# Patient Record
(demographics unavailable — no encounter records)

---

## 2024-12-13 NOTE — HISTORY OF PRESENT ILLNESS
[FreeTextEntry5] : 11th grade ([facility]) IEP: LD Resource Room - every day. Preferential seating; refocus and redirect as needed; testing accommodations (including reading test questions aloud)  Repeated KG when moved from [place]. [FreeTextEntry1] : De is now in 11th grade, and he is doing well academically.  Q1 RC is excellent -- with lowest grade was 88.   Last year (10th grade), De overall did well in school in his last quarter.  She says that the addition of resource room has been very HUGE of working with De -- working on weaknesses and making sure he completes all of his HW and assignments.  His grades vary across subjects and semesters.  His grades range from 70s - 80s and occasionally higher.  He passed all of his classes and there was no need for summer school.  IEP for 11th grade -- same as last year.  HIs current academic performance has tremendously benefited from the addition of resource room in 10th grade.  By contrast, in 9th grade, he failed math by 1 point.  (Mom says that failing with COVID guidance was <55.)  He had to go to summer school for math, which he passed.  (In 8th grade, De failed several different subjects; he needed to do summer school.  The same thing happened to him in 7th grade as well.)  De continues to have an IEP based on his Fall 2022 triennial eval; psycho-ed testing again revealed a gap between his Verbal IQ and reading scores.  Mom says that De still enjoys gong to resource room every day, and his self-esteem has also improved with the improvement and support that has come with his IEP.  In terms of medication, Mom says that the single IR dose in AM continues to be working out well.  His core subjects are in the morning, and electives are in the afternoon.  This evolved when De only took MAS 15 mg IR one time in the AM during the summer (2023) for summer school. Because he had fewer side effects on the one-daily dose, he continued on just the AM IR dose for school this year (10th grade), and this seems to be OK.  He has a much-improved appetite at lunch, he is not scratching his scalp, and he is having fewer episodes of Raynaud's.  He is also "a lot more social".    Since June 2023, he is no longer on guanfacine, cyproheptadine, or N-A-C.  He continues to take his fish oil capsules.  Mom thinks that Chens Raynaud's seems to be better; if he is in a cold room, his feet will turn blue.  Mom thinks it is much less frequent now that he is on a lower dose.  Mom has always said that it is not at all problematic for De. When he wears socks, this is less of a visible problem.  De no longer gets Periactin for his appetite; it was stopped over the summer (2023) when they stopped using the AXR and just the IR AM dose.   De has no sleep issues.  He goes to sleep quickly on time -- in bed at 9 and asleep by 10 - 10:30 -- all without melatonin.   Socially -- Mom says that De is doing well; he is feeling more comfortable with himself.  He has a nice [place] of friends.  Activities: Literary magazine; art club; [employer] club.  enjoys drawing; De enjoys computers -- vipul, programming, making short films from his drawings.    Driving: learner's permit; no 's Ed.   Summer 2024: trip to [place].  Working with mom on her work.  Art school as well for 3-4 weeks. Summer 2023: summer school for math.  Trips with mom and [employer]GM.  Summer 2022: Summer school for 1 Earth Science (though he failed several classes). Summer 2021: Summer school; [place] to see his paternal aunt for a month; saw his dad 2x while there.  Summer 2020: home; no plans. Summer 2019: day camp for 4 weeks; then vacation with MG.   Bilingual exposure: Mom speaks to De in Uzbek, but De is not fluent. He is OK receptively but limited expressively.  When seen in January 2020: Mom asked about getting a psychiatric eval for her own mom in terms of some paranoia and/or delusions.  MGLESLEY is an independently functioning  who would not be accepting of a psychiatry referral.  I suggested that mom go see a psychiatrist herself to get a consult about her own mother.  Mom says that her mother seemed to be worse in May 2022 but seemingly somewhat better when asked in October 2022 and continues to do well as of September 2023. [Major Illness] : no major illness [Major Injury] : no major injury [Surgery] : no surgery [Hospitalizations] : no hospitalizations [New Medications] : no new medication [New Allergies] : no new allergies [FreeTextEntry6] : Loss of appetite -- variable. [Home] : at home, [unfilled] , at the time of the visit. [Other Location: e.g. Home (Enter Location, City,State)___] : at [unfilled] [Mother] : mother [FreeTextEntry3] : Jaycee Hudson (mother)

## 2024-12-13 NOTE — REVIEW OF SYSTEMS
[Ear Infections] : no ear infections [Normal] : Psychiatric [FreeTextEntry4] : No longer has issues with dry throat in AM when he awakens

## 2024-12-13 NOTE — SOCIAL HISTORY
[FreeTextEntry6] : HH: now just Mom and De, 1 cat (Jonathan) and 1 Rottweiler (Max; b 2016) Family will be moving to [place] in Summer 2026 for mom to finish her acupuncture program.   Mother: now FT works for a neurological acupuncture clinic in the [facility] -- e.g., strokes; in the office on weekends and works from home on weekdays.  Mom is still in school to become an acupuncturist; she will finish in 2027.  (Mom worked as a  on an animal farm through [employer]. Mom got head-butted by a sheep and had a concussion, no LOC but mentally foggy; workmen's comp case covered medical expenses; case now closed.)     Mom gets help with childcare from her mother and her godmother.   Father: No longer involved with De.  Lives in [place]; HVAC work.  Almost no contact since 2014, though he did see his dad 2x in summer of 2021.

## 2024-12-13 NOTE — HISTORY OF PRESENT ILLNESS
[FreeTextEntry5] : 11th grade ([facility]) IEP: LD Resource Room - every day. Preferential seating; refocus and redirect as needed; testing accommodations (including reading test questions aloud)  Repeated KG when moved from [place]. [FreeTextEntry1] : De is now in 11th grade, and he is doing well academically.  Q1 RC is excellent -- with lowest grade was 88.   Last year (10th grade), De overall did well in school in his last quarter.  She says that the addition of resource room has been very HUGE of working with De -- working on weaknesses and making sure he completes all of his HW and assignments.  His grades vary across subjects and semesters.  His grades range from 70s - 80s and occasionally higher.  He passed all of his classes and there was no need for summer school.  IEP for 11th grade -- same as last year.  HIs current academic performance has tremendously benefited from the addition of resource room in 10th grade.  By contrast, in 9th grade, he failed math by 1 point.  (Mom says that failing with COVID guidance was <55.)  He had to go to summer school for math, which he passed.  (In 8th grade, De failed several different subjects; he needed to do summer school.  The same thing happened to him in 7th grade as well.)  De continues to have an IEP based on his Fall 2022 triennial eval; psycho-ed testing again revealed a gap between his Verbal IQ and reading scores.  Mom says that De still enjoys gong to resource room every day, and his self-esteem has also improved with the improvement and support that has come with his IEP.  In terms of medication, Mom says that the single IR dose in AM continues to be working out well.  His core subjects are in the morning, and electives are in the afternoon.  This evolved when De only took MAS 15 mg IR one time in the AM during the summer (2023) for summer school. Because he had fewer side effects on the one-daily dose, he continued on just the AM IR dose for school this year (10th grade), and this seems to be OK.  He has a much-improved appetite at lunch, he is not scratching his scalp, and he is having fewer episodes of Raynaud's.  He is also "a lot more social".    Since June 2023, he is no longer on guanfacine, cyproheptadine, or N-A-C.  He continues to take his fish oil capsules.  Mom thinks that Chens Raynaud's seems to be better; if he is in a cold room, his feet will turn blue.  Mom thinks it is much less frequent now that he is on a lower dose.  Mom has always said that it is not at all problematic for De. When he wears socks, this is less of a visible problem.  De no longer gets Periactin for his appetite; it was stopped over the summer (2023) when they stopped using the AXR and just the IR AM dose.   De has no sleep issues.  He goes to sleep quickly on time -- in bed at 9 and asleep by 10 - 10:30 -- all without melatonin.   Socially -- Mom says that De is doing well; he is feeling more comfortable with himself.  He has a nice [place] of friends.  Activities: Literary magazine; art club; [email or web address] club.  enjoys drawing; De enjoys computers -- vipul, programming, making short films from his drawings.    Driving: learner's permit; no 's Ed.   Summer 2024: trip to [place].  Working with mom on her work.  Art school as well for 3-4 weeks. Summer 2023: summer school for math.  Trips with mom and MGM.  Summer 2022: Summer school for 1 Earth Science (though he failed several classes). Summer 2021: Summer school; visited [place] to see his paternal aunt for a month; saw his dad 2x while there.  Summer 2020: home; no plans. Summer 2019: day camp for 4 weeks; then vacation with MG.   Bilingual exposure: Mom speaks to De in Maltese, but De is not fluent. He is OK receptively but limited expressively.  When seen in January 2020: Mom asked about getting a psychiatric eval for her own mom in terms of some paranoia and/or delusions.  MGLESLEY is an independently functioning  who would not be accepting of a psychiatry referral.  I suggested that mom go see a psychiatrist herself to get a consult about her own mother.  Mom says that her mother seemed to be worse in May 2022 but seemingly somewhat better when asked in October 2022 and continues to do well as of September 2023. [Major Illness] : no major illness [Major Injury] : no major injury [Surgery] : no surgery [Hospitalizations] : no hospitalizations [New Medications] : no new medication [New Allergies] : no new allergies [FreeTextEntry6] : Loss of appetite -- variable. [Home] : at home, [unfilled] , at the time of the visit. [Other Location: e.g. Home (Enter Location, City,State)___] : at [unfilled] [Mother] : mother [FreeTextEntry3] : Jaycee Hudson (mother)

## 2024-12-13 NOTE — REASON FOR VISIT
[Follow-Up Visit] : a follow-up visit [FreeTextEntry2] : ADHD -- Medication monitoring and management  LD [FreeTextEntry4] : Adderall 15 mg IR every day in AM only    Still takes a custom-made herbal formulation for his liver (Gilbert's disease) Acupuncture - for liver and general health. (Mom works in an acupuncture clinic and is in school to be an acupuncturist.) [FreeTextEntry1] : Mother [FreeTextEntry5] : n/a [FreeTextEntry3] : August 2024

## 2024-12-13 NOTE — SOCIAL HISTORY
[FreeTextEntry6] : HH: now just Mom and De, 1 cat (Jonathan) and 1 Rottweiler (Max; b 2016) Family will be moving to [place] in Summer 2026 for mom to finish her acupuncture program.   Mother: now FT works for a neurological acupuncture clinic in the [facility] [facility] -- e.g., strokes; in the office on weekends and works from home on weekdays.  Mom is still in school to become an acupuncturist; she will finish in 2027.  (Mom worked as a  on an animal farm through [employer]. Mom got head-butted by a sheep and had a concussion, no LOC but mentally foggy; workmen's comp case covered medical expenses; case now closed.)     Mom gets help with childcare from her mother and her godmother.   Father: No longer involved with De.  Lives in [place]; HVAC work.  Almost no contact since 2014, though he did see his dad 2x in summer of 2021.

## 2024-12-13 NOTE — PLAN
[FreeTextEntry3] : Continue IEP Continue MAS 15 mg IR in AM. Triennial eval -- not due until fall 2025. Answered mother's questions. Office follow-up: 3 - 4 months for an in-office visit; sooner as needed. Telephone follow-up: as needed.

## 2025-03-17 NOTE — PLAN
[FreeTextEntry3] : Continue IEP Continue MAS 15 mg IR in AM. Triennial eval -- not due until fall 2025. Answered mother's questions. Office follow-up: 3 - 4 months; sooner as needed. Telephone follow-up: as needed.

## 2025-03-17 NOTE — REASON FOR VISIT
[Follow-Up Visit] : a follow-up visit [FreeTextEntry2] : ADHD -- Medication monitoring and management  LD [FreeTextEntry4] : Adderall 15 mg IR every day in AM only    Still takes a custom-made herbal formulation for his liver (Gilbert's disease) Acupuncture - for liver and general health. (Mom works in an acupuncture clinic and is in school to be an acupuncturist.) [FreeTextEntry1] : Mother [FreeTextEntry5] : n/a [FreeTextEntry3] : December 2024

## 2025-03-17 NOTE — HISTORY OF PRESENT ILLNESS
[FreeTextEntry5] : 11th grade ([employer] [employer]) IEP: LD Resource Room - every day. Preferential seating; refocus and redirect as needed; testing accommodations (including reading test questions aloud)  Repeated KG when moved from [place]. [FreeTextEntry1] : De continues to do well academically.   Q1 RC is excellent -- with lowest grade was 88.  Q2 RC is excellent -- overall slightly better.   De's current academic performance has tremendously benefited from the addition of resource room in 10th grade.  By contrast, in 9th grade, he failed math by 1 point.  (Mom says that failing with COVID guidance was <55.)  He had to go to summer school for math, which he passed.  (In 8th grade, De failed several different subjects; he needed to do summer school.  The same thing happened to him in 7th grade as well.)  De continues to have an IEP based on his Fall 2022 triennial eval; psycho-ed testing again revealed a gap between his Verbal IQ and reading scores.  De still enjoys gong to resource room every day, and his self-esteem has also improved with the improvement and support that has come with his IEP.  He is due for his triennial re-eval in the fall 2025.  Last year (10th grade), De overall did well in school in his last quarter.  She says that the addition of resource room has been very HUGE of working with De -- working on weaknesses and making sure he completes all of his HW and assignments.  His grades vary across subjects and semesters.  His grades range from 70s - 80s and occasionally higher.  He passed all of his classes and there was no need for summer school.  IEP for 11th grade -- same as last year.  In terms of medication, Mom says that the single IR dose in AM continues to be working out well.  His core subjects are in the morning, and electives are in the afternoon.  This evolved when De only took MAS 15 mg IR one time in the AM during the summer (2023) for summer school. Because he had fewer side effects on the one-daily dose, he continued on just the AM IR dose for school this year (10th grade), and this seems to be OK.  He has a much-improved appetite at lunch, he is not scratching his scalp, and he is having fewer episodes of Raynaud's.  He is also "a lot more social".   Since June 2023, he is no longer on guanfacine, cyproheptadine, or N-A-C.  He now only inconsistently takes fish oil capsules.  Mom thinks that De's Raynaud's seems to be better; if he is in a cold room, his feet will turn blue.  Mom thinks it is much less frequent now that he is on a lower dose.  Mom has always said that it is not at all problematic for De. When he wears socks, this is less of a visible problem.  De no longer gets Periactin for his appetite; it was stopped over the summer (2023) when they stopped using the AXR and just the IR AM dose.   De has no sleep issues.  He goes to sleep quickly on time -- in bed at 9 and asleep by 10 - 10:30 -- all without melatonin.   Socially -- Mom says that De is doing well; he is feeling more comfortable with himself.  He has a nice [place] of friends.  Mom says that he has a "virtual GF"  Activities: Literary magazine; art club; [employer] club.  enjoys drawing; De enjoys computers -- vipul, programming, making short films from his drawings.    Driving: learner's permit; no 's Ed. but drives with his mother.   Summer 2025: no plans as of yet. Summer 2024: trip to [place].  Working with mom on her work.  Art school as well for 3-4 weeks. Summer 2023: summer school for math.  Trips with mom and MG.  Summer 2022: Summer school for 1 Earth Science (though he failed several classes). Summer 2021: Summer school; visited [place] to see his paternal aunt for a month; saw his dad 2x while there.  Summer 2020: home; no plans. Summer 2019: day camp for 4 weeks; then vacation with [facility].   Bilingual exposure: Mom speaks to De in Lithuanian, but De is not fluent. He is OK receptively but limited expressively.  When seen in January 2020: Mom asked about getting a psychiatric eval for her own mom in terms of some paranoia and/or delusions.  TICO is an independently functioning  who would not be accepting of a psychiatry referral.  I suggested that mom go see a psychiatrist herself to get a consult about her own mother.  Mom says that her mother seemed to be worse in May 2022 but seemingly somewhat better when asked in October 2022 and continues to do well as of September 2023. [Major Illness] : no major illness [Major Injury] : no major injury [Surgery] : no surgery [Hospitalizations] : no hospitalizations [New Medications] : no new medication [New Allergies] : no new allergies [FreeTextEntry6] : Loss of appetite -- variable. [Home] : at home, [unfilled] , at the time of the visit. [Other Location: e.g. Home (Enter Location, City,State)___] : at [unfilled] [Mother] : mother [FreeTextEntry3] : Jaycee Hudson (mother)

## 2025-03-17 NOTE — SOCIAL HISTORY
[FreeTextEntry6] : HH: now just Mom and De, 1 cat (Jonathan) and 1 Rottweiler (Max; b 2016) Family will be moving to [place] in Summer 2026 for mom to finish her acupuncture program.   Mother: now FT works for a neurological acupuncture clinic in the [facility] -- e.g., strokes; in the office on weekends and works from home on weekdays.  Mom is still in school to become an acupuncturist; she will finish in 2027.  (Mom worked as a  on an animal farm through [employer]. Mom got head-butted by a sheep and had a concussion, no LOC but mentally foggy; workmen's comp case covered medical expenses; case now closed.)     Mom gets help with childcare from her mother and her godmother.   Father: No longer involved with De.  Lives in [place]; HVAC work.  Almost no contact since 2014, though he did see his dad 2x in summer of 2021.

## 2025-07-21 NOTE — REASON FOR VISIT
[Follow-Up Visit] : a follow-up visit [FreeTextEntry2] : ADHD -- Medication monitoring and management  LD [FreeTextEntry4] : Adderall 15 mg IR every day in AM only (including mendoza)  Still takes a custom-made herbal formulation for his liver (Gilbert's disease) Acupuncture - for liver and general health. (Mom works in an acupuncture clinic and is in school to be an acupuncturist.) [FreeTextEntry1] : Mother [FreeTextEntry5] : n/a [FreeTextEntry3] : December 2024

## 2025-07-21 NOTE — HISTORY OF PRESENT ILLNESS
[Home] : at home, [unfilled] , at the time of the visit. [Other Location: e.g. Home (Enter Location, City,State)___] : at [unfilled] [Mother] : mother [FreeTextEntry5] : Finished 11th grade ([employer]) IEP: LD Resource Room - every day. Preferential seating; refocus and redirect as needed; testing accommodations (including reading test questions aloud)  Repeated KG when moved from [place]. [FreeTextEntry1] : De did well academically.  His overall average for the year was 87.  His weakest subject was history (high 70's or 80, though Regents was 63).  Mom still feels that De's academic performance tremendously benefited from the addition of resource room in 10th grade.  By contrast, in 9th grade, he failed math by 1 point.  (Mom says that failing with COVID guidance was <55.)  He had to go to summer school for math, which he passed.  (In 8th grade, De failed several different subjects; he needed to do summer school.  The same thing happened to him in 7th grade as well.)  De continues to have an IEP based on his Fall 2022 triennial eval; psycho-ed testing again revealed a gap between his Verbal IQ and reading scores.  De still enjoys gong to resource room every day, and his self-esteem has also improved with the improvement and support that has come with his IEP.   He is due for his triennial re-eval in the fall 2025.  In 10th grade, De overall did well in school in his last quarter.  The addition of resource room was very HUGE in terms of helping De -- working on weaknesses and making sure he completes all of his HW and assignments.    IEP for 12th grade -- will be almost the same as this year.  In terms of medication, Mom says that the single IR dose in AM continues to be working out well.  In 11th grade, his core subjects were in the morning, and electives jessika the afternoon.  This dosing regimen evolved when De only took MAS 15 mg IR one time in the AM during the summer (2023) for summer school. Because he had fewer side effects on the one-daily dose, he continued on just the AM IR dose for school this year (10th grade), and this seems to be OK.  He has a much-improved appetite at lunch, he is not scratching his scalp, and he is having fewer episodes of Raynaud's.  He is also "a lot more social".   Since June 2023, he is no longer on guanfacine, cyproheptadine, or N-A-C.  He now only inconsistently takes fish oil capsules.  Mom thinks that Chens Raynaud's continues to be better; if he is in a cold room wearing shorts, then his feet will turn blue; this is very rare.  Mom previously stated that she thinks it is much less frequent now that he is on a lower dose.  Mom has always said that it is not at all problematic for De. When he wears socks, this is less of a visible problem.    De has no sleep issues.  He goes to sleep quickly on time -- in bed at 9 and asleep by 10 - 10:30 -- all without melatonin.   Socially -- Mom says that De is doing well; he is feeling more comfortable with himself.  He has a nice [place] of friends.  Mom says that he has a "virtual GF" ("Potato Chip") who lives in [place].  Activities: Literary magazine; art club; chess club.  Enjoys drawing.  De enjoys computers -- vipul, programming, making short films from his drawings.    Driving: learner's permit; no 's Ed. but drives with his mother.  Mom says that he is not a great  when the roads are busy.  Summer 2025: taking art classes at the [employer].  Family vacation to the [place] with mom. Summer 2024: trip to [place].  Working with mom on her work.  Art school as well for 3-4 weeks. Summer 2023: summer school for math.  Trips with mom and MGM.  Summer 2022: Summer school for 1 Earth Science (though he failed several classes). Summer 2021: Summer school; visited [place] to see his paternal aunt for a month; saw his dad 2x while there.  Summer 2020: home; no plans. Summer 2019: day camp for 4 weeks; then vacation with MG.   Bilingual exposure: Mom speaks to De in Slovenian, but De is not fluent. He is OK receptively but limited expressively.  MGM: When seen in January 2020: Mom asked about getting a psychiatric eval for her own mom in terms of some paranoia and/or delusions.  MGM is an independently functioning  who would not be accepting of a psychiatry referral.  I suggested that mom go see a psychiatrist herself to get a consult about her own mother.  Mom says that her mother seemed to be worse in May 2022 but seemingly somewhat better when asked in October 2022 and continues to do well as of September 2023.  Mom says that her mother continues to have some issues, but she did not provide any details. [Major Illness] : no major illness [Major Injury] : no major injury [Surgery] : no surgery [Hospitalizations] : no hospitalizations [New Medications] : no new medication [New Allergies] : no new allergies [FreeTextEntry6] : Loss of appetite -- variable. [FreeTextEntry3] : Jaycee Hudson (mother)

## 2025-07-21 NOTE — SOCIAL HISTORY
[FreeTextEntry6] : HH: now just Mom and De, 1 cat (Jonathan); dog --put down 7/25 due to cancer 1 Rottweiler (Max; evan 2016) Family will be moving to [place] in Summer 2026 for mom to finish her acupuncture program.   Mother: now FT works for a neurological acupuncture clinic in the [facility] -- e.g., strokes; in the office on weekends and works from home on weekdays.  Mom is still in school to become an acupuncturist; she will finish in 2027.  (Mom worked as a  on an animal farm through [employer]. Mom got head-butted by a sheep and had a concussion, no LOC but mentally foggy; workmen's comp case covered medical expenses; case now closed.)     Mom gets help with childcare from her mother and her godmother.   Father: No longer involved with De.  Lives in [place]; HVAC work.  Almost no contact since 2014, though he did see his dad 2x in summer of 2021.

## 2025-07-21 NOTE — REVIEW OF SYSTEMS
[Normal] : Psychiatric [Ear Infections] : no ear infections [FreeTextEntry4] : No longer has issues with dry throat in AM when he awakens

## 2025-07-21 NOTE — PLAN
[FreeTextEntry3] : Continue IEP Continue MAS 15 mg IR in AM. Reminded mom of triennial eval -- due in fall 2025. Answered mother's questions. Office follow-up: 3 - 4 months; sooner as needed. Telephone follow-up: as needed.